# Patient Record
Sex: FEMALE | Race: WHITE | NOT HISPANIC OR LATINO | URBAN - METROPOLITAN AREA
[De-identification: names, ages, dates, MRNs, and addresses within clinical notes are randomized per-mention and may not be internally consistent; named-entity substitution may affect disease eponyms.]

---

## 2017-03-02 ENCOUNTER — IMPORTED ENCOUNTER (OUTPATIENT)
Dept: URBAN - METROPOLITAN AREA CLINIC 43 | Facility: CLINIC | Age: 77
End: 2017-03-02

## 2017-10-19 ENCOUNTER — IMPORTED ENCOUNTER (OUTPATIENT)
Dept: URBAN - METROPOLITAN AREA CLINIC 43 | Facility: CLINIC | Age: 77
End: 2017-10-19

## 2017-10-19 PROBLEM — E11.9: Noted: 2017-10-19

## 2017-10-19 PROBLEM — H26.492: Noted: 2017-10-19

## 2017-10-19 PROBLEM — H35.363: Noted: 2017-10-19

## 2019-11-04 NOTE — PATIENT DISCUSSION
The patient was informed that with [address] [facility] for distance, they will need glasses for all near and intermediate activities after surgery. The patient understands there is a possibility they may need an enhancement after surgery. The patient elects [employer] OD, goal of emmetropia.

## 2019-11-12 NOTE — PATIENT DISCUSSION
The patient was informed that with [address] [facility] for distance, they will need glasses for all near and intermediate activities after surgery. The patient understands there is a possibility they may need an enhancement after surgery. The patient elects [employer] OD, goal of emmetropia.

## 2019-11-12 NOTE — PATIENT DISCUSSION
The patient was informed that with [address] [facility] for distance, they will need glasses for all near and intermediate activities after surgery. The patient understands there is a possibility they may need an enhancement after surgery. The patient elects [employer] OD, goal of emmetropia.

## 2020-04-18 ASSESSMENT — VISUAL ACUITY
OS_CC: J1+
OS_SC: 20/20
OD_OTHER: 20/100.
OD_CC: J1+
OS_SC: 20/20+2
OD_SC: 20/20
OD_SC: 20/20+2

## 2020-04-18 ASSESSMENT — TONOMETRY
OD_IOP_MMHG: 16.0
OS_IOP_MMHG: 15.0

## 2020-04-18 ASSESSMENT — KERATOMETRY
OD_AXISANGLE_DEGREES: 101
OS_AXISANGLE_DEGREES: 81
OS_K2POWER_DIOPTERS: 45.5
OS_AXISANGLE2_DEGREES: 171
OD_K1POWER_DIOPTERS: 45.75
OD_AXISANGLE2_DEGREES: 11
OD_K2POWER_DIOPTERS: 45.5
OS_K1POWER_DIOPTERS: 45.75

## 2021-05-18 NOTE — PATIENT DISCUSSION
The patient was informed that with [address] [facility] for distance, they will need glasses for all near and intermediate activities after surgery. The patient understands there is a possibility they may need an enhancement after surgery. The patient elects [employer] OS, goal of emmetropia.

## 2021-05-26 NOTE — PATIENT DISCUSSION
The patient was informed that with [address] [facility] for distance, they will need glasses for all near and intermediate activities after surgery. The patient understands there is a possibility they may need an enhancement after surgery. The patient elects [employer] OS, goal of emmetropia.

## 2021-05-26 NOTE — PATIENT DISCUSSION
The patient was informed that with [address] [facility] for distance, they will need glasses for all near and intermediate activities after surgery. The patient understands there is a possibility they may need an enhancement after surgery. The patient elects [employer] OS, goal of emmetropia.

## 2021-06-29 NOTE — PROCEDURE NOTE: SURGICAL
<p>Prior to commencing surgery patient identification, surgical procedure, site, and side were confirmed by Dr. Munira Moreau. Following topical proparacaine anesthesia, the patient was positioned at the YAG laser, a contact lens coupled to the cornea with methylcellulose and an axial posterior capsulotomy performed without complication using 3.4 Mj x 11. Excess methylcellulose was washed from the eye, one drop of Alphagan was instilled and the patient returned to the holding area having tolerated the procedure well and without complication. </p><p> [id number]</p>

## 2021-08-09 NOTE — PATIENT DISCUSSION
Patient stating he has monocular diplopia today OD. Schedule with [facility] next available for post op.

## 2021-08-11 NOTE — PATIENT DISCUSSION
PO with Dr. Sarath Santiago for evaluation possible LRI vs LASIK.  Patient informed that he is over 1 year past his initial cataract surgery but he did not come in sooner due to covid.

## 2021-08-16 NOTE — PATIENT DISCUSSION
The patient desires to have better distance and near vision. The patient was advised on the option of Limbal Relaxing Incision to improve vision. The patient elects to proceed with LRI OD, goal of emmetropia.

## 2021-08-16 NOTE — PATIENT DISCUSSION
Patient made aware of 24/7 emergency services. Trazodone refill request.   Last filled 6/28/18 for 30 tabs no refills.   LOV 7/16/18.   Routed to PCP for approval.

## 2021-08-19 NOTE — PATIENT DISCUSSION
Despite some risk factors, the patient does not demonstrate definitive evidence of glaucoma at this time. yes

## 2021-12-20 NOTE — PROCEDURE NOTE: CLINICAL
PROCEDURE NOTE: Punctal Plugs, Rhoda Hicks ([id number], [id number]) OU. Diagnosis: Keratoconjunctivitis Sicca, Not Specified As Sjögren's. Prior to treatment, the risks/benefits/alternatives were discussed. The patient wished to proceed with procedure. Temporary collagen plugs were inserted. Patient tolerated procedure well. There were no complications. Post procedure instructions given. Laurence Storey

## 2022-05-27 NOTE — PATIENT DISCUSSION
Stable. Modified Advancement Flap Text: The defect edges were debeveled with a #15 scalpel blade.  Given the location of the defect, shape of the defect and the proximity to free margins a modified advancement flap was deemed most appropriate.  Using a sterile surgical marker, an appropriate advancement flap was drawn incorporating the defect and placing the expected incisions within the relaxed skin tension lines where possible.    The area thus outlined was incised deep to adipose tissue with a #15 scalpel blade.  The skin margins were undermined to an appropriate distance in all directions utilizing iris scissors.

## 2022-06-04 ENCOUNTER — TELEPHONE ENCOUNTER (OUTPATIENT)
Dept: URBAN - METROPOLITAN AREA CLINIC 68 | Facility: CLINIC | Age: 82
End: 2022-06-04

## 2022-06-05 ENCOUNTER — TELEPHONE ENCOUNTER (OUTPATIENT)
Dept: URBAN - METROPOLITAN AREA CLINIC 68 | Facility: CLINIC | Age: 82
End: 2022-06-05

## 2022-06-25 ENCOUNTER — TELEPHONE ENCOUNTER (OUTPATIENT)
Age: 82
End: 2022-06-25

## 2022-06-26 ENCOUNTER — TELEPHONE ENCOUNTER (OUTPATIENT)
Age: 82
End: 2022-06-26

## 2022-12-08 NOTE — PROCEDURE NOTE: CLINICAL
PROCEDURE NOTE: Punctal Plugs, Dissolvable OU. Diagnosis: Keratoconjunctivitis Sicca, Not Specified As Sjögren's. Anesthesia: Proparacaine 0.5%. Prior to treatment, the risks/benefits/alternatives were discussed. The patient wished to proceed with procedure. 1 drop of Proparacaine 0.5% was instilled. Puncta was dilated with punctal dilator. Dissolvable punctal plugs were inserted. Size/location of plugs inserted: Inserted oasis soft plug extended duration 180 BLL 0.5 mm.  no complications. . The patient tolerated the procedure well. There were no complications. Post procedure instructions given. Christian Bryson

## 2024-01-05 NOTE — PATIENT DISCUSSION
Borderline controlled, changes made today: continue melatonin, start taking trazodone Avoid taking Benadryl for sleep.   Recommended observation.

## 2024-01-16 NOTE — PATIENT DISCUSSION
Recommended observation. Subjective   Patient ID: Nadeen is a 35 year old female.    Chief Complaint   Patient presents with    Headache     X sat.  Visual disturbances-states saw zig zags lasting ~ 10 mins sat night       36 y/o F presenting with complaints of headache for the past 3 days. Pt has a hx of headaches when she was younger but not since this time. Pt notes Saturday night she had visual changes for a few minutes which resolved spontaneously. No recurrent episode. No chest pain, palpitations, SOB. Pt takes ibuprofen which helps with headache.     Headache        Past Medical History:   Diagnosis Date    Bipolar 1 disorder (CMD) 3/7/2019    On Prozac       Mental disorder     Bipolar 1       MEDICATIONS:  Current Outpatient Medications   Medication Sig    hydroquinone 4 % cream Apply twice daily (spot treat) to the darks spots on left inframammary area for two months    tretinoin (RETIN-A) 0.05 % cream Apply nightly as a pea-size dose of medication to each of the five areas of the face (central forehead, chin, nose, and cheeks)     No current facility-administered medications for this visit.       ALLERGIES:  ALLERGIES:   Allergen Reactions    Seasonal Other (See Comments)       PAST SURGICAL HISTORY:  Past Surgical History:   Procedure Laterality Date    Iud insertion  09/24/2022    DR JONES    Oral surgery procedure      1 wisdom tooth removed.         FAMILY HISTORY:  Family History   Problem Relation Age of Onset    Hypertension Mother     Diabetes Father     Cancer, Breast Maternal Aunt         3 mat. aunts with breast csancer after age 50    Diabetes Brother     Thyroid Brother     Anxiety disorder Brother     Patient is unaware of any medical problems Daughter     Patient is unaware of any medical problems Son     Cancer, Colon Neg Hx     Cancer, Ovarian Neg Hx        SOCIAL HISTORY:  Social History     Tobacco Use    Smoking status: Never    Smokeless tobacco: Never   Vaping Use    Vaping Use: never used   Substance Use  Topics    Alcohol use: Yes     Comment: OCCASSIONAL    Drug use: Never         Review of Systems   Constitutional:  Negative for chills, fatigue and fever.   Eyes:  Positive for visual disturbance.   Respiratory:  Negative for cough and shortness of breath.    Cardiovascular:  Negative for chest pain and palpitations.   Gastrointestinal:  Negative for abdominal pain, constipation, diarrhea, nausea and vomiting.   Neurological:  Positive for headaches. Negative for dizziness, syncope, speech difficulty, weakness, light-headedness and numbness.   Psychiatric/Behavioral:  Negative for confusion and decreased concentration.        Objective   Physical Exam  Constitutional:       General: She is not in acute distress.     Appearance: Normal appearance. She is not ill-appearing, toxic-appearing or diaphoretic.   HENT:      Head: Normocephalic and atraumatic.   Eyes:      General:         Right eye: No discharge.         Left eye: No discharge.      Extraocular Movements: Extraocular movements intact.      Conjunctiva/sclera: Conjunctivae normal.      Pupils: Pupils are equal, round, and reactive to light.   Cardiovascular:      Rate and Rhythm: Normal rate and regular rhythm.      Heart sounds: Normal heart sounds. No murmur heard.     No friction rub. No gallop.   Pulmonary:      Effort: No respiratory distress.      Breath sounds: Normal breath sounds. No stridor. No wheezing, rhonchi or rales.   Neurological:      General: No focal deficit present.      Mental Status: She is alert and oriented to person, place, and time.      Cranial Nerves: No cranial nerve deficit.      Sensory: No sensory deficit.      Motor: No weakness.      Coordination: Coordination normal.      Gait: Gait normal.   Psychiatric:         Mood and Affect: Mood normal.         Behavior: Behavior normal.         Thought Content: Thought content normal.         Judgment: Judgment normal.       Visit Vitals  /74   Pulse 87   Temp 97.4 °F (36.3  °C)   Resp 14   SpO2 100%       ASSESSMENT:    Nadeen was seen today for headache.    Diagnoses and all orders for this visit:    Nonintractable headache, unspecified chronicity pattern, unspecified headache type         - pt has a hx of headaches. New onset headache for 3 days. Pt had one episode of visual changes 3 days ago. This lasted a few minutes but did not reoccur. Pt has no visual changes since this time. No nausea/vomiting. No weakness. Pt has lack of sleep due to autistic child at home. Possible source of headache. Recommend fluids. Rest. Continue OTC analgesics as this has been helping. If visual changes return then pt instructed to go to the ER immediately. Normal neuro exam and no other neuro symptoms.     - The following pertinent patient's history were reviewed and updated as appropriate: allergies, current medications, past medical history, past surgical history, personal and social history, problem list, clinical staff's note and vital signs.     - All questions and concerns addressed.    - If symptoms acutely worsen or if new/concerning symptoms develop then patient has been instructed to  go straight to the emergency room.